# Patient Record
Sex: MALE | Race: WHITE | Employment: UNEMPLOYED | URBAN - METROPOLITAN AREA
[De-identification: names, ages, dates, MRNs, and addresses within clinical notes are randomized per-mention and may not be internally consistent; named-entity substitution may affect disease eponyms.]

---

## 2023-01-31 RX ORDER — GUANFACINE 1 MG/1
1 TABLET ORAL DAILY
COMMUNITY

## 2023-01-31 RX ORDER — ALBUTEROL SULFATE 90 UG/1
2 AEROSOL, METERED RESPIRATORY (INHALATION) EVERY 4 HOURS PRN
COMMUNITY
Start: 2020-04-05

## 2023-01-31 RX ORDER — TRAZODONE HYDROCHLORIDE 100 MG/1
100 TABLET ORAL
COMMUNITY

## 2023-01-31 RX ORDER — ATOMOXETINE 25 MG/1
40 CAPSULE ORAL DAILY
COMMUNITY

## 2023-01-31 RX ORDER — DIPHENHYDRAMINE HCL 25 MG
25 CAPSULE ORAL EVERY 6 HOURS PRN
COMMUNITY
Start: 2021-11-07

## 2023-01-31 RX ORDER — ZIPRASIDONE HYDROCHLORIDE 80 MG/1
80 CAPSULE ORAL 2 TIMES DAILY WITH MEALS
COMMUNITY

## 2024-06-07 ENCOUNTER — HOSPITAL ENCOUNTER (EMERGENCY)
Facility: HOSPITAL | Age: 19
Discharge: HOME | End: 2024-06-08
Attending: PEDIATRICS
Payer: MEDICAID

## 2024-06-07 ENCOUNTER — APPOINTMENT (OUTPATIENT)
Facility: HOSPITAL | Age: 19
End: 2024-06-07
Payer: MEDICAID

## 2024-06-07 VITALS
OXYGEN SATURATION: 99 % | SYSTOLIC BLOOD PRESSURE: 126 MMHG | DIASTOLIC BLOOD PRESSURE: 80 MMHG | BODY MASS INDEX: 18.14 KG/M2 | RESPIRATION RATE: 13 BRPM | HEART RATE: 54 BPM | WEIGHT: 130.07 LBS | TEMPERATURE: 98.4 F

## 2024-06-07 DIAGNOSIS — S93.401A SPRAIN OF RIGHT ANKLE, UNSPECIFIED LIGAMENT, INITIAL ENCOUNTER: Primary | ICD-10-CM

## 2024-06-07 PROCEDURE — 99283 EMERGENCY DEPT VISIT LOW MDM: CPT

## 2024-06-07 PROCEDURE — 73610 X-RAY EXAM OF ANKLE: CPT

## 2024-06-07 ASSESSMENT — PAIN SCALES - GENERAL: PAINLEVEL_OUTOF10: 10

## 2024-06-07 ASSESSMENT — LIFESTYLE VARIABLES
HOW OFTEN DO YOU HAVE A DRINK CONTAINING ALCOHOL: NEVER
HOW MANY STANDARD DRINKS CONTAINING ALCOHOL DO YOU HAVE ON A TYPICAL DAY: PATIENT DOES NOT DRINK

## 2024-06-08 NOTE — ED TRIAGE NOTES
Patient arrives via EMS following a right ankle injury that happened around 3pm. Patient was playing softball when he fell down a hole and heard a \"snap\".      Patient given 100mcg fentanyl via EMS   20G IV right AC    A&Ox4     Hx: Osteogenesis Imperfecta (OI)

## 2024-06-08 NOTE — ED NOTES
Introduced self to patient at this time. Patient resting in bed, A&Ox4. Patient offered warm blankets for comfort. Call bell within reach, bed in lowest position. Patients mother at the bedside at this time.